# Patient Record
Sex: MALE | Race: BLACK OR AFRICAN AMERICAN | NOT HISPANIC OR LATINO | Employment: FULL TIME | URBAN - METROPOLITAN AREA
[De-identification: names, ages, dates, MRNs, and addresses within clinical notes are randomized per-mention and may not be internally consistent; named-entity substitution may affect disease eponyms.]

---

## 2017-12-19 ENCOUNTER — OFFICE VISIT (OUTPATIENT)
Dept: NEUROLOGY | Facility: CLINIC | Age: 47
End: 2017-12-19

## 2017-12-19 VITALS
HEIGHT: 73 IN | WEIGHT: 223 LBS | DIASTOLIC BLOOD PRESSURE: 80 MMHG | SYSTOLIC BLOOD PRESSURE: 122 MMHG | BODY MASS INDEX: 29.55 KG/M2

## 2017-12-19 DIAGNOSIS — R20.2 NUMBNESS AND TINGLING OF RIGHT SIDE OF FACE: Primary | ICD-10-CM

## 2017-12-19 DIAGNOSIS — R20.0 NUMBNESS AND TINGLING OF RIGHT SIDE OF FACE: Primary | ICD-10-CM

## 2017-12-19 PROCEDURE — 99243 OFF/OP CNSLTJ NEW/EST LOW 30: CPT | Performed by: PSYCHIATRY & NEUROLOGY

## 2017-12-19 NOTE — PROGRESS NOTES
Subjective:     Patient ID: Steve Abdi is a 47 y.o. male.    History of Present Illness   The patient is a 47-year-old right-handed gentleman who was seen for further evaluation of some numbness and pain along the right side of his face and scalp. The patient was seen today in consultation per the request of Dr. Chandler.  He had problems with a root canal in a right mandibular molar and since that time this problem has been prominent.  It is slowly improving.  Was tried on inflammatory medication at times he gets stabbing pains.  It is more of an aggravation than any sort of disability.  He is not had any neurodiagnostic studies.    The following portions of the patient's history were reviewed and updated as appropriate: allergies, current medications, past family history, past medical history, past social history, past surgical history and problem list.    Family History   Problem Relation Age of Onset   • Dementia Mother    • Hypertension Mother    • Arthritis Mother        Active Ambulatory Problems     Diagnosis Date Noted   • Numbness and tingling of right side of face 12/19/2017     Resolved Ambulatory Problems     Diagnosis Date Noted   • No Resolved Ambulatory Problems     Past Medical History:   Diagnosis Date   • Headache, tension-type    • Lactose intolerance      Social History     Social History   • Marital status: Single     Spouse name: N/A   • Number of children: N/A   • Years of education: N/A     Occupational History   • Not on file.     Social History Main Topics   • Smoking status: Never Smoker   • Smokeless tobacco: Not on file   • Alcohol use 1.8 oz/week     1 Glasses of wine, 1 Cans of beer, 1 Shots of liquor per week   • Drug use: Not on file   • Sexual activity: Not on file     Other Topics Concern   • Not on file     Social History Narrative     No current outpatient prescriptions on file.    Review of Systems   Constitutional: Negative.    HENT: Positive for congestion, dental problem,  ear pain, sinus pressure and tinnitus. Negative for drooling, ear discharge, facial swelling, hearing loss, mouth sores, nosebleeds, postnasal drip, rhinorrhea, sinus pain, sneezing, sore throat, trouble swallowing and voice change.    Eyes: Positive for photophobia. Negative for pain, discharge, redness, itching and visual disturbance.   Respiratory: Negative.    Cardiovascular: Negative.    Gastrointestinal: Negative.    Endocrine: Negative.    Musculoskeletal: Positive for arthralgias, myalgias, neck pain and neck stiffness. Negative for back pain, gait problem and joint swelling.   Skin: Negative.    Allergic/Immunologic: Positive for food allergies. Negative for environmental allergies and immunocompromised state.   Neurological: Positive for headaches. Negative for dizziness, tremors, seizures, syncope, facial asymmetry, speech difficulty, weakness, light-headedness and numbness.   Hematological: Negative.    Psychiatric/Behavioral: Negative.         Objective:    Neurologic Exam  Mental status was appropriate for age.  Fundoscopy showed no papilledema. Visual fields were full to OKN.  Eye movements were full and conjugate.  Pupillary reflexes were mid-range and symmetric.  No facial weakness was noted.  Tongue was midline.  There was no pattern of focal weakness.  Gait was appropriate for age.  No pathologic reflexes were noted.  No cerebellar signs were noted.  Tone was normal.  Deep tendon reflexes were 2+ and symmetric.  Full sensation was normal to pinprick.  Muscles of mastication were normal.  Physical Exam    Assessment/Plan:     Steve was seen today for headache.    Diagnoses and all orders for this visit:    Numbness and tingling of right side of face       As likely there has been some nerve irritation after his dental procedure.  This is slowly improving.  He has no fixed neurological deficits.  I feel that further testing is not warranted.  As he is improving a feel intervention with medication  is not indicated.  Mostly attempted to reassure him.    Plan to see him back in the office as necessary.     Thank you for allowing me to share in the care of this patient.  Truman Mitchell M.D.

## 2018-02-02 ENCOUNTER — OFFICE VISIT (OUTPATIENT)
Dept: NEUROLOGY | Facility: CLINIC | Age: 48
End: 2018-02-02

## 2018-02-02 VITALS
BODY MASS INDEX: 29.82 KG/M2 | DIASTOLIC BLOOD PRESSURE: 60 MMHG | WEIGHT: 225 LBS | SYSTOLIC BLOOD PRESSURE: 120 MMHG | HEIGHT: 73 IN

## 2018-02-02 DIAGNOSIS — R20.0 NUMBNESS AND TINGLING OF RIGHT SIDE OF FACE: ICD-10-CM

## 2018-02-02 DIAGNOSIS — R20.2 NUMBNESS AND TINGLING OF RIGHT SIDE OF FACE: ICD-10-CM

## 2018-02-02 DIAGNOSIS — G50.1 PAIN, FACE, ATYPICAL: Primary | ICD-10-CM

## 2018-02-02 PROCEDURE — 99213 OFFICE O/P EST LOW 20 MIN: CPT | Performed by: PSYCHIATRY & NEUROLOGY

## 2018-02-02 NOTE — PROGRESS NOTES
Subjective:     Patient ID: Steve Abdi is a 47 y.o. male.    History of Present Illness   The patient continues to have persistent pain and numbness along the right side of his face.  This began when he had a root canal in July.  It has been improving but does persist and is bothersome there are days where it is quite severe.  He has not had a neurodiagnostic studies.  The following portions of the patient's history were reviewed and updated as appropriate: allergies, current medications, past family history, past medical history, past social history, past surgical history and problem list.    No current outpatient prescriptions on file.    Review of Systems   Constitutional: Negative.    Neurological: Positive for numbness and headaches. Negative for dizziness, tremors, seizures, syncope, facial asymmetry, speech difficulty, weakness and light-headedness.   Psychiatric/Behavioral: Negative.         Objective:    Neurologic Exam  Mental status examination was appropriate.  Funduscopy, visual fields, eye movements and pupillary reflexes were normal.  No facial weakness was noted.  Gait was normal.  No pattern of focal weakness was noted.  Facial sensation was normal throughout.  TMJ function was normal.  Muscles of mastication were normal.  Physical Exam    Assessment/Plan:     Steve was seen today for numbness.    Diagnoses and all orders for this visit:    Pain, face, atypical  -     MRI Brain With & Without Contrast; Future    Numbness and tingling of right side of face       In view of this patient's symptoms being persistent I set up an MRI the brain to exclude any serious etiology.  The patient will call me following this.  I'm not place him on medicine and plan to see him as needed in the office. Thank you for allowing me to share in the care of this patient.  Truman Mitchell M.D.

## 2018-02-09 ENCOUNTER — HOSPITAL ENCOUNTER (OUTPATIENT)
Dept: MRI IMAGING | Facility: HOSPITAL | Age: 48
Discharge: HOME | End: 2018-02-09
Attending: PSYCHIATRY & NEUROLOGY | Admitting: PSYCHIATRY & NEUROLOGY

## 2018-02-09 DIAGNOSIS — G50.1 PAIN, FACE, ATYPICAL: ICD-10-CM

## 2018-02-09 PROCEDURE — 0 GADOBENATE DIMEGLUMINE 529 MG/ML SOLUTION: Performed by: PSYCHIATRY & NEUROLOGY

## 2018-02-09 PROCEDURE — 70553 MRI BRAIN STEM W/O & W/DYE: CPT

## 2018-02-09 PROCEDURE — A9577 INJ MULTIHANCE: HCPCS | Performed by: PSYCHIATRY & NEUROLOGY

## 2018-02-09 PROCEDURE — 82565 ASSAY OF CREATININE: CPT

## 2018-02-09 RX ADMIN — GADOBENATE DIMEGLUMINE 20 ML: 529 INJECTION, SOLUTION INTRAVENOUS at 08:45

## 2018-02-10 LAB — CREAT BLDA-MCNC: 1.2 MG/DL (ref 0.6–1.3)

## 2018-02-11 ENCOUNTER — APPOINTMENT (OUTPATIENT)
Dept: MRI IMAGING | Facility: HOSPITAL | Age: 48
End: 2018-02-11
Attending: PSYCHIATRY & NEUROLOGY

## 2018-02-13 ENCOUNTER — TELEPHONE (OUTPATIENT)
Dept: NEUROLOGY | Facility: CLINIC | Age: 48
End: 2018-02-13

## 2018-02-13 NOTE — TELEPHONE ENCOUNTER
----- Message from Cristin Wright sent at 2/13/2018  3:10 PM EST -----  Contact: [phone]  Patient would like to know his MRI results.

## 2018-02-22 ENCOUNTER — TELEPHONE (OUTPATIENT)
Dept: NEUROLOGY | Facility: CLINIC | Age: 48
End: 2018-02-22

## 2018-02-22 NOTE — TELEPHONE ENCOUNTER
Patient would like to know if there is anything natural he can take if her gets some more nerve pain. Please advise.

## 2018-10-21 ENCOUNTER — APPOINTMENT (OUTPATIENT)
Dept: GENERAL RADIOLOGY | Facility: HOSPITAL | Age: 48
End: 2018-10-21

## 2018-10-21 ENCOUNTER — HOSPITAL ENCOUNTER (EMERGENCY)
Facility: HOSPITAL | Age: 48
Discharge: HOME | End: 2018-10-21
Attending: EMERGENCY MEDICINE | Admitting: EMERGENCY MEDICINE

## 2018-10-21 VITALS
HEART RATE: 71 BPM | HEIGHT: 73 IN | OXYGEN SATURATION: 100 % | BODY MASS INDEX: 29.16 KG/M2 | SYSTOLIC BLOOD PRESSURE: 118 MMHG | WEIGHT: 220 LBS | RESPIRATION RATE: 16 BRPM | DIASTOLIC BLOOD PRESSURE: 65 MMHG | TEMPERATURE: 99 F

## 2018-10-21 DIAGNOSIS — R07.89 ATYPICAL CHEST PAIN: Primary | ICD-10-CM

## 2018-10-21 LAB
ALBUMIN SERPL-MCNC: 4.4 G/DL (ref 3.5–5.2)
ALBUMIN/GLOB SERPL: 1.2 G/DL
ALP SERPL-CCNC: 69 U/L (ref 39–117)
ALT SERPL W P-5'-P-CCNC: 19 U/L (ref 1–41)
ANION GAP SERPL CALCULATED.3IONS-SCNC: 13.1 MMOL/L
AST SERPL-CCNC: 23 U/L (ref 1–40)
BASOPHILS # BLD AUTO: 0.01 10*3/MM3 (ref 0–0.2)
BASOPHILS NFR BLD AUTO: 0.2 % (ref 0–1.5)
BILIRUB SERPL-MCNC: 1 MG/DL (ref 0.1–1.2)
BUN BLD-MCNC: 11 MG/DL (ref 6–20)
BUN/CREAT SERPL: 8.9 (ref 7–25)
CALCIUM SPEC-SCNC: 9.8 MG/DL (ref 8.6–10.5)
CHLORIDE SERPL-SCNC: 100 MMOL/L (ref 98–107)
CO2 SERPL-SCNC: 24.9 MMOL/L (ref 22–29)
CREAT BLD-MCNC: 1.24 MG/DL (ref 0.76–1.27)
DEPRECATED RDW RBC AUTO: 45.4 FL (ref 37–54)
EOSINOPHIL # BLD AUTO: 0.08 10*3/MM3 (ref 0–0.7)
EOSINOPHIL NFR BLD AUTO: 2 % (ref 0.3–6.2)
ERYTHROCYTE [DISTWIDTH] IN BLOOD BY AUTOMATED COUNT: 14.8 % (ref 11.5–14.5)
GFR SERPL CREATININE-BSD FRML MDRD: 76 ML/MIN/1.73
GLOBULIN UR ELPH-MCNC: 3.8 GM/DL
GLUCOSE BLD-MCNC: 106 MG/DL (ref 65–99)
HCT VFR BLD AUTO: 44.4 % (ref 40.4–52.2)
HGB BLD-MCNC: 15 G/DL (ref 13.7–17.6)
IMM GRANULOCYTES # BLD: 0.01 10*3/MM3 (ref 0–0.03)
IMM GRANULOCYTES NFR BLD: 0.2 % (ref 0–0.5)
LIPASE SERPL-CCNC: 16 U/L (ref 13–60)
LYMPHOCYTES # BLD AUTO: 1.46 10*3/MM3 (ref 0.9–4.8)
LYMPHOCYTES NFR BLD AUTO: 35.7 % (ref 19.6–45.3)
MCH RBC QN AUTO: 28.1 PG (ref 27–32.7)
MCHC RBC AUTO-ENTMCNC: 33.8 G/DL (ref 32.6–36.4)
MCV RBC AUTO: 83.1 FL (ref 79.8–96.2)
MONOCYTES # BLD AUTO: 0.25 10*3/MM3 (ref 0.2–1.2)
MONOCYTES NFR BLD AUTO: 6.1 % (ref 5–12)
NEUTROPHILS # BLD AUTO: 2.29 10*3/MM3 (ref 1.9–8.1)
NEUTROPHILS NFR BLD AUTO: 56 % (ref 42.7–76)
PLATELET # BLD AUTO: 230 10*3/MM3 (ref 140–500)
PMV BLD AUTO: 9.1 FL (ref 6–12)
POTASSIUM BLD-SCNC: 4.1 MMOL/L (ref 3.5–5.2)
PROT SERPL-MCNC: 8.2 G/DL (ref 6–8.5)
RBC # BLD AUTO: 5.34 10*6/MM3 (ref 4.6–6)
SODIUM BLD-SCNC: 138 MMOL/L (ref 136–145)
TROPONIN T SERPL-MCNC: <0.01 NG/ML (ref 0–0.03)
TROPONIN T SERPL-MCNC: <0.01 NG/ML (ref 0–0.03)
WBC NRBC COR # BLD: 4.09 10*3/MM3 (ref 4.5–10.7)

## 2018-10-21 PROCEDURE — 84484 ASSAY OF TROPONIN QUANT: CPT | Performed by: EMERGENCY MEDICINE

## 2018-10-21 PROCEDURE — 84484 ASSAY OF TROPONIN QUANT: CPT | Performed by: NURSE PRACTITIONER

## 2018-10-21 PROCEDURE — 71045 X-RAY EXAM CHEST 1 VIEW: CPT

## 2018-10-21 PROCEDURE — 93005 ELECTROCARDIOGRAM TRACING: CPT | Performed by: EMERGENCY MEDICINE

## 2018-10-21 PROCEDURE — 80053 COMPREHEN METABOLIC PANEL: CPT | Performed by: NURSE PRACTITIONER

## 2018-10-21 PROCEDURE — 85025 COMPLETE CBC W/AUTO DIFF WBC: CPT | Performed by: NURSE PRACTITIONER

## 2018-10-21 PROCEDURE — 99283 EMERGENCY DEPT VISIT LOW MDM: CPT

## 2018-10-21 PROCEDURE — 83690 ASSAY OF LIPASE: CPT | Performed by: EMERGENCY MEDICINE

## 2018-10-21 PROCEDURE — 93010 ELECTROCARDIOGRAM REPORT: CPT | Performed by: INTERNAL MEDICINE

## 2018-10-21 RX ORDER — OMEPRAZOLE 20 MG/1
20 CAPSULE, DELAYED RELEASE ORAL DAILY
Qty: 30 CAPSULE | Refills: 0 | Status: SHIPPED | OUTPATIENT
Start: 2018-10-21 | End: 2018-10-30

## 2018-10-21 RX ORDER — PYRIDOXINE HCL (VITAMIN B6) 100 MG
1 TABLET ORAL AS NEEDED
COMMUNITY

## 2018-10-21 NOTE — ED NOTES
Pt states last week he was having upper right quadrant pain and went to the immediate care where he was given doxycycline, for the past several days the pain has gone to mid chest and also has intermediately right lower back pain. Pt is in NAD and resting comfortably with call light within reach.      Mami Aparicio, RN  10/21/18 9818

## 2018-10-21 NOTE — ED TRIAGE NOTES
Chest pains for about a week now, patient states that the pains are in the center of his chest and sometimes radiate under his right chest. Patient states he felt them last night while resting in bed.

## 2018-10-21 NOTE — ED PROVIDER NOTES
" EMERGENCY DEPARTMENT ENCOUNTER    CHIEF COMPLAINT  Chief Complaint: Chest Pain  History given by: Patient  History limited by: Vague Historian  Room Number: 27/27  PMD: Tavon Cahndler MD      HPI:  Pt is a 48 y.o. male who presents complaining of Intermittent mid-sternum CP that began a week ago, currently gone. Pt describes the pain as \"burning\" initially for a day but then turned into a discomfort and began 30 minutes after eating pizza.  The pain would last all day at times.  Pt took Tylenol when pain began with mild relief but hasn't taken anything else. Pt reports similar episode 6 years ago saw GI and told it was reflux. Pt denies any other aggravating factors or alleviating factors. Pt reports belching and increased gas. Pt was taking Cipro few weeks ago and Rx of Doxy and stopped it but started it back up due to concern for GI issues several days ago. Pt has seen Urologist and been to [facility] with unremarkable tests which included a urinalysis and urine culture. Pt denies fever, SOA, cough, cold, N/V/D, or pain or swelling in legs. Pt denies smoking. Pt drinks two days a week. Pt denies Hx of HLD or HTN. FMHx of HTN but no heart disease. Pt denies illegal drug use.       Duration:  1 week  Onset: gradual  Timing: intermittent  Location: mid-sternum  Radiation: none  Quality: \"burning\"  Intensity/Severity: moderate  Progression: resolved  Associated Symptoms: belching, RUQ  Aggravating Factors: pain developed 30 minutes after eating pizza  Alleviating Factors: none  Previous Episodes: Pt reports similar episode 6 years ago saw GI and told it was reflux.  Treatment before arrival: none    PAST MEDICAL HISTORY  Active Ambulatory Problems     Diagnosis Date Noted   • Numbness and tingling of right side of face 12/19/2017   • Pain, face, atypical 02/02/2018     Resolved Ambulatory Problems     Diagnosis Date Noted   • No Resolved Ambulatory Problems     Past Medical History:   Diagnosis Date   • Headache, " tension-type    • Lactose intolerance    • UTI (urinary tract infection)        PAST SURGICAL HISTORY  Past Surgical History:   Procedure Laterality Date   • CIRCUMCISION     • HERNIA REPAIR     • UPPER GASTROINTESTINAL ENDOSCOPY  04/19/2013    inactive gastritis        FAMILY HISTORY  Family History   Problem Relation Age of Onset   • Dementia Mother    • Hypertension Mother    • Arthritis Mother        SOCIAL HISTORY  Social History     Social History   • Marital status: Single     Spouse name: N/A   • Number of children: N/A   • Years of education: N/A     Occupational History   • Not on file.     Social History Main Topics   • Smoking status: Never Smoker   • Smokeless tobacco: Not on file   • Alcohol use 1.8 oz/week     1 Glasses of wine, 1 Cans of beer, 1 Shots of liquor per week   • Drug use: Unknown   • Sexual activity: Not on file     Other Topics Concern   • Not on file     Social History Narrative   • No narrative on file       ALLERGIES  Iodinated diagnostic agents    REVIEW OF SYSTEMS  Review of Systems   Constitutional: Negative for activity change, appetite change and fever.   HENT: Negative for congestion and sore throat.    Eyes: Negative.    Respiratory: Negative for cough and shortness of breath.    Cardiovascular: Positive for chest pain (mid-sternum). Negative for leg swelling.   Gastrointestinal: Positive for abdominal pain (RUQ). Negative for diarrhea and vomiting.   Endocrine: Negative.    Genitourinary: Negative for decreased urine volume and dysuria.   Musculoskeletal: Negative for neck pain.   Skin: Negative for rash and wound.   Allergic/Immunologic: Negative.    Neurological: Negative for weakness, numbness and headaches.   Hematological: Negative.    Psychiatric/Behavioral: Negative.    All other systems reviewed and are negative.      PHYSICAL EXAM  ED Triage Vitals   Temp Heart Rate Resp BP SpO2   10/21/18 1414 10/21/18 1414 10/21/18 1414 10/21/18 1430 10/21/18 1414   97.5 °F (36.4  °C) 77 14 134/79 100 %      Temp src Heart Rate Source Patient Position BP Location FiO2 (%)   10/21/18 1414 10/21/18 1414 10/21/18 1528 10/21/18 1528 --   Tympanic Monitor Lying Left arm        Physical Exam   Constitutional: He is oriented to person, place, and time. No distress.   HENT:   Head: Normocephalic and atraumatic.   Eyes: Pupils are equal, round, and reactive to light. EOM are normal.   Neck: Normal range of motion. Neck supple.   Cardiovascular: Normal rate, regular rhythm, normal heart sounds and intact distal pulses.    No murmur heard.  Pulmonary/Chest: Effort normal and breath sounds normal. No respiratory distress.   O2 sats = 100% on room air   Abdominal: Soft. Bowel sounds are normal. He exhibits no distension. There is no tenderness. There is no rebound and no guarding.   Musculoskeletal: Normal range of motion. He exhibits no edema.   Neurological: He is alert and oriented to person, place, and time. He has normal sensation and normal strength. No cranial nerve deficit. Gait normal. GCS score is 15.   Skin: Skin is warm and dry.   Psychiatric: Mood and affect normal.   Nursing note and vitals reviewed.      LAB RESULTS  Lab Results (last 24 hours)     Procedure Component Value Units Date/Time    CBC & Differential [[id number]] Collected:  10/21/18 1432    Specimen:  Blood Updated:  10/21/18 9358    Narrative:       The following orders were created for panel order CBC & Differential.  Procedure                               Abnormality         Status                     ---------                               -----------         ------                     CBC Auto Differential[[id number]]        Abnormal            Final result                 Please view results for these tests on the individual orders.    Comprehensive Metabolic Panel [[id number]]  (Abnormal) Collected:  10/21/18 1432    Specimen:  Blood Updated:  10/21/18 1519     Glucose 106 (H) mg/dL      BUN 11 mg/dL      Creatinine 1.24  mg/dL      Sodium 138 mmol/L      Potassium 4.1 mmol/L      Chloride 100 mmol/L      CO2 24.9 mmol/L      Calcium 9.8 mg/dL      Total Protein 8.2 g/dL      Albumin 4.40 g/dL      ALT (SGPT) 19 U/L      AST (SGOT) 23 U/L      Comment: Specimen hemolyzed.  Results may be affected.        Alkaline Phosphatase 69 U/L      Total Bilirubin 1.0 mg/dL      eGFR  African Amer 76 mL/min/1.73      Globulin 3.8 gm/dL      A/G Ratio 1.2 g/dL      BUN/Creatinine Ratio 8.9     Anion Gap 13.1 mmol/L     Troponin [[id number]]  (Normal) Collected:  10/21/18 1432    Specimen:  Blood Updated:  10/21/18 1509     Troponin T <0.010 ng/mL     Narrative:       Troponin T Reference Ranges:  Less than 0.03 ng/mL:    Negative for AMI  0.03 to 0.09 ng/mL:      Indeterminant for AMI  Greater than 0.09 ng/mL: Positive for AMI    CBC Auto Differential [[id number]]  (Abnormal) Collected:  10/21/18 1432    Specimen:  Blood Updated:  10/21/18 1448     WBC 4.09 (L) 10*3/mm3      RBC 5.34 10*6/mm3      Hemoglobin 15.0 g/dL      Hematocrit 44.4 %      MCV 83.1 fL      MCH 28.1 pg      MCHC 33.8 g/dL      RDW 14.8 (H) %      RDW-SD 45.4 fl      MPV 9.1 fL      Platelets 230 10*3/mm3      Neutrophil % 56.0 %      Lymphocyte % 35.7 %      Monocyte % 6.1 %      Eosinophil % 2.0 %      Basophil % 0.2 %      Immature Grans % 0.2 %      Neutrophils, Absolute 2.29 10*3/mm3      Lymphocytes, Absolute 1.46 10*3/mm3      Monocytes, Absolute 0.25 10*3/mm3      Eosinophils, Absolute 0.08 10*3/mm3      Basophils, Absolute 0.01 10*3/mm3      Immature Grans, Absolute 0.01 10*3/mm3     Lipase [[id number]]  (Normal) Collected:  10/21/18 1432    Specimen:  Blood Updated:  10/21/18 1636     Lipase 16 U/L     Troponin [[id number]]  (Normal) Collected:  10/21/18 1724    Specimen:  Blood Updated:  10/21/18 1756     Troponin T <0.010 ng/mL     Narrative:       Troponin T Reference Ranges:  Less than 0.03 ng/mL:    Negative for AMI  0.03 to 0.09 ng/mL:      Indeterminant for  AMI  Greater than 0.09 ng/mL: Positive for AMI          I ordered the above labs and reviewed the results    RADIOLOGY  XR Chest 1 View   Final Result   Negative AP portable chest radiograph.       This report was finalized on 10/21/2018 4:55 PM by Dr. Mickey Omalley M.D.               I ordered the above noted radiological studies. Interpreted by radiologist.  Reviewed by me in PACS.       PROCEDURES  Procedures    EKG          EKG time: 218  Rhythm/Rate: 72,NSR  QRS, axis: Narrow QRs, Nml axis  ST and T waves: No acute process   Nml QT      Interpreted Contemporaneously by me, independently viewed  No old for comparison    PROGRESS AND CONSULTS  ED Course as of Oct 21 1926   Sun Oct 21, 2018   1441 Ruq pain, cp  [KG]   1623 PERC rule is negative with a very low index suspicion of PE. He has no SOB or Pleuritic chest pain. NSR with O2 sat 100 %  [MM]   1842 6:43 PM  I talked with the patient at length and answered all questions.  He is pain seems very atypical for a cardiac etiology, pulmonary embolism etiology, or thoracic aortic dissection etiology.  It seems very likely that it is GI related and that it initially started as a burning sensation and then a mild persistent headache.  The symptoms started after he ate some pizza.  He is also had dyspepsia as well.  Several years ago he had a similar episode of discomfort that was diagnosed as gastroesophageal reflux disease.  Patient's heart score is 1.  He does not have a pleuritic nature to his chest pain and has good strong equal pulses to his upper extremity and lower extremity within normal appearing chest x-ray.I have discussed at length with the patient the results of the tests.  I have a low index of suspicion that anything is serious occurring.  The symptoms are atypical for coronary artery disease, pulmonary embolism, or thoracic aortic dissection.  The patient understands the limitations of testing and understands that we are unable to rule out a  disease process 100%.  The patient feels comfortable and agrees in being discharged home.  The patient will will return if symptoms worsen, develop shortness of breath, weakness in extremities, or any concerns.  They will also follow up with cardiology as provided.  I also did offer the patient if he was concerned admission and observation and he refused.    [MM]      ED Course User Index  [KG] Krys Solis, APRN  [MM] David Soto MD     PERC score =     1415  Ordered EKG.     1616  Notified pt of unremarkable lab work, including negative Troponin. Discussed plan to further evaluate with repeat troponin. Pt is agreeable.     1622  Ordered second troponin and lipase.     1834  Pt recheck. Notified pt of negative CXR results, negative lab work, including second troponin and lipase. Discussed plan to discharge pt home with Rx for Prilosec and f/u with Cardiology for possible stress test. Instructed pt to RTER if worsening Sx or pt develops fever or SOA. Advised pt to avoid NSAIDs, caffiene, and alcohol. Pt understands and agrees with treatment plan, all concerns addressed.         MEDICAL DECISION MAKING  Results were reviewed/discussed with the patient and they were also made aware of online access. Pt also made aware that some labs, such as cultures, will not be resulted during ER visit and follow up with PMD is necessary.     MDM  Number of Diagnoses or Management Options  Atypical chest pain:      Amount and/or Complexity of Data Reviewed  Clinical lab tests: ordered and reviewed (1st Troponin < 0.01)  Tests in the radiology section of CPT®: ordered and reviewed (CXR shows no acute findings. )  Tests in the medicine section of CPT®: ordered and reviewed (See EKG results in procedure. )  Independent visualization of images, tracings, or specimens: yes           DIAGNOSIS  Final diagnoses:   Atypical chest pain       DISPOSITION  DISCHARGE    Patient discharged in stable condition.    Reviewed implications  of results, diagnosis, meds, responsibility to follow up, warning signs and symptoms of possible worsening, potential complications and reasons to return to ER.    Patient/Family voiced understanding of above instructions.    Discussed plan for discharge, as there is no emergent indication for admission. Patient referred to primary care provider for BP management due to today's BP. Pt/family is agreeable and understands need for follow up and repeat testing.  Pt is aware that discharge does not mean that nothing is wrong but it indicates no emergency is present that requires admission and they must continue care with follow-up as given below or physician of their choice.     FOLLOW-UP  Kaveh Suresh MD  [address] [facility] [address]  [facility] [id number] [phone]      Call in the am for appointment., Return if pain worsens, If symptoms worsen, shortness of breath, fever, any concerns    Tavon Chandler MD  [address] [facility] [id number] [phone]    In 1 week  Return if pain worsens, If symptoms worsen, shortness of breath, fever, any concerns         Medication List      New Prescriptions    omeprazole 20 MG capsule  Commonly known as:  priLOSEC  Take 1 capsule by mouth Daily for 30 days.              Latest Documented Vital Signs:  As of 7:26 PM  BP- 118/65 HR- 71 Temp- 99 °F (37.2 °C) (Oral) O2 sat- 100%    --  Documentation assistance provided by randall Luke for Dr. Soto.  Information recorded by the scribe was done at my direction and has been verified and validated by me.          Truman Luke  10/21/18 1916       David Soto MD  10/21/18 1926

## 2018-10-26 ENCOUNTER — HOSPITAL ENCOUNTER (EMERGENCY)
Facility: HOSPITAL | Age: 48
Discharge: HOME | End: 2018-10-26
Attending: EMERGENCY MEDICINE | Admitting: EMERGENCY MEDICINE

## 2018-10-26 VITALS
HEIGHT: 73 IN | RESPIRATION RATE: 16 BRPM | WEIGHT: 220 LBS | HEART RATE: 80 BPM | SYSTOLIC BLOOD PRESSURE: 125 MMHG | OXYGEN SATURATION: 100 % | BODY MASS INDEX: 29.16 KG/M2 | TEMPERATURE: 97 F | DIASTOLIC BLOOD PRESSURE: 74 MMHG

## 2018-10-26 DIAGNOSIS — K21.9 GASTROESOPHAGEAL REFLUX DISEASE WITHOUT ESOPHAGITIS: Primary | ICD-10-CM

## 2018-10-26 DIAGNOSIS — R07.9 CHEST PAIN IN ADULT: ICD-10-CM

## 2018-10-26 LAB
ALBUMIN SERPL-MCNC: 4.7 G/DL (ref 3.5–5.2)
ALBUMIN/GLOB SERPL: 1.3 G/DL
ALP SERPL-CCNC: 70 U/L (ref 39–117)
ALT SERPL W P-5'-P-CCNC: 18 U/L (ref 1–41)
ANION GAP SERPL CALCULATED.3IONS-SCNC: 14.9 MMOL/L
AST SERPL-CCNC: 20 U/L (ref 1–40)
BASOPHILS # BLD AUTO: 0.01 10*3/MM3 (ref 0–0.2)
BASOPHILS NFR BLD AUTO: 0.2 % (ref 0–1.5)
BILIRUB SERPL-MCNC: 1.1 MG/DL (ref 0.1–1.2)
BUN BLD-MCNC: 10 MG/DL (ref 6–20)
BUN/CREAT SERPL: 8.4 (ref 7–25)
CALCIUM SPEC-SCNC: 10.1 MG/DL (ref 8.6–10.5)
CHLORIDE SERPL-SCNC: 100 MMOL/L (ref 98–107)
CO2 SERPL-SCNC: 23.1 MMOL/L (ref 22–29)
CREAT BLD-MCNC: 1.19 MG/DL (ref 0.76–1.27)
D DIMER PPP FEU-MCNC: 0.39 MCGFEU/ML (ref 0–0.49)
DEPRECATED RDW RBC AUTO: 43.6 FL (ref 37–54)
EOSINOPHIL # BLD AUTO: 0.05 10*3/MM3 (ref 0–0.7)
EOSINOPHIL NFR BLD AUTO: 1 % (ref 0.3–6.2)
ERYTHROCYTE [DISTWIDTH] IN BLOOD BY AUTOMATED COUNT: 14.4 % (ref 11.5–14.5)
GFR SERPL CREATININE-BSD FRML MDRD: 80 ML/MIN/1.73
GLOBULIN UR ELPH-MCNC: 3.5 GM/DL
GLUCOSE BLD-MCNC: 96 MG/DL (ref 65–99)
HCT VFR BLD AUTO: 42 % (ref 40.4–52.2)
HGB BLD-MCNC: 14.5 G/DL (ref 13.7–17.6)
HOLD SPECIMEN: NORMAL
HOLD SPECIMEN: NORMAL
IMM GRANULOCYTES # BLD: 0 10*3/MM3 (ref 0–0.03)
IMM GRANULOCYTES NFR BLD: 0 % (ref 0–0.5)
LYMPHOCYTES # BLD AUTO: 1.57 10*3/MM3 (ref 0.9–4.8)
LYMPHOCYTES NFR BLD AUTO: 30.9 % (ref 19.6–45.3)
MCH RBC QN AUTO: 28.4 PG (ref 27–32.7)
MCHC RBC AUTO-ENTMCNC: 34.5 G/DL (ref 32.6–36.4)
MCV RBC AUTO: 82.4 FL (ref 79.8–96.2)
MONOCYTES # BLD AUTO: 0.33 10*3/MM3 (ref 0.2–1.2)
MONOCYTES NFR BLD AUTO: 6.5 % (ref 5–12)
NEUTROPHILS # BLD AUTO: 3.12 10*3/MM3 (ref 1.9–8.1)
NEUTROPHILS NFR BLD AUTO: 61.4 % (ref 42.7–76)
PLATELET # BLD AUTO: 245 10*3/MM3 (ref 140–500)
PMV BLD AUTO: 9.4 FL (ref 6–12)
POTASSIUM BLD-SCNC: 3.8 MMOL/L (ref 3.5–5.2)
PROT SERPL-MCNC: 8.2 G/DL (ref 6–8.5)
RBC # BLD AUTO: 5.1 10*6/MM3 (ref 4.6–6)
SODIUM BLD-SCNC: 138 MMOL/L (ref 136–145)
TROPONIN T SERPL-MCNC: <0.01 NG/ML (ref 0–0.03)
WBC NRBC COR # BLD: 5.08 10*3/MM3 (ref 4.5–10.7)
WHOLE BLOOD HOLD SPECIMEN: NORMAL
WHOLE BLOOD HOLD SPECIMEN: NORMAL

## 2018-10-26 PROCEDURE — 85025 COMPLETE CBC W/AUTO DIFF WBC: CPT | Performed by: PHYSICIAN ASSISTANT

## 2018-10-26 PROCEDURE — 84484 ASSAY OF TROPONIN QUANT: CPT | Performed by: PHYSICIAN ASSISTANT

## 2018-10-26 PROCEDURE — 99283 EMERGENCY DEPT VISIT LOW MDM: CPT

## 2018-10-26 PROCEDURE — 85379 FIBRIN DEGRADATION QUANT: CPT | Performed by: PHYSICIAN ASSISTANT

## 2018-10-26 PROCEDURE — 93005 ELECTROCARDIOGRAM TRACING: CPT | Performed by: EMERGENCY MEDICINE

## 2018-10-26 PROCEDURE — 80053 COMPREHEN METABOLIC PANEL: CPT | Performed by: PHYSICIAN ASSISTANT

## 2018-10-26 PROCEDURE — 93005 ELECTROCARDIOGRAM TRACING: CPT

## 2018-10-26 PROCEDURE — 93010 ELECTROCARDIOGRAM REPORT: CPT | Performed by: INTERNAL MEDICINE

## 2018-10-26 NOTE — ED NOTES
Pt reports hx of asthma. He reports after drinking a smoothie with banana in it he had inc SOA; he wonders if he has an allergy to bananas. Reassurance given; call light in reach. Pts breathing even and unlabored. Pt appears in NAD at this time. Family at bedside.        Sanaz Green, RN  10/26/18 1695

## 2018-10-26 NOTE — ED NOTES
Patient reports that his chest discomfort/SOA is improving. Earlier, patient states he had burning sensation in chest that radiated down his left arm after eating his smoothie containing banana this morning. Today was not the first time having bananas, but he said the one he had today wasn't ripe yet. Today, instead of taking his Prilosec prior to eating breakfast, he skipped it to see if he could handle food without it. Patient just started on Prilosec this past Monday.     Jade Ventura, JORGE  10/26/18 6640

## 2018-10-26 NOTE — ED PROVIDER NOTES
" EMERGENCY DEPARTMENT ENCOUNTER    CHIEF COMPLAINT  Chief Complaint: Dyspnea  History given by: Patient  History limited by:   Room Number: 36/36  PMD: Tavon Chandler MD      HPI:  Pt is a 48 y.o. male who presents complaining of dyspnea that began 5 days ago. Pt reports he also has had mild CP since that time. Pt was seen here at that time. He reports that he has hx of GERD and has been taking Prilosec. Pt reports that he has skipped some dosages of his Prilosec. Today he reports eating a banana which later caused some reflux and SOA. Pt denies any abd pain, fever, chills.    Duration: 5 days  Onset: gradual  Timing: intermittent  Quality: \"short of breath\"  Intensity/Severity: moderate  Progression: unchanged  Associated Symptoms: CP, indigestion  Aggravating Factors: food  Alleviating Factors: none  Previous Episodes: hx of GERD  Treatment before arrival: seen here for same 5 days ago. Pt has been taking Prilosec    PAST MEDICAL HISTORY  Active Ambulatory Problems     Diagnosis Date Noted   • Numbness and tingling of right side of face 12/19/2017   • Pain, face, atypical 02/02/2018     Resolved Ambulatory Problems     Diagnosis Date Noted   • No Resolved Ambulatory Problems     Past Medical History:   Diagnosis Date   • Headache, tension-type    • Lactose intolerance    • UTI (urinary tract infection)        PAST SURGICAL HISTORY  Past Surgical History:   Procedure Laterality Date   • CIRCUMCISION     • HERNIA REPAIR     • UPPER GASTROINTESTINAL ENDOSCOPY  04/19/2013    inactive gastritis        FAMILY HISTORY  Family History   Problem Relation Age of Onset   • Dementia Mother    • Hypertension Mother    • Arthritis Mother        SOCIAL HISTORY  Social History     Social History   • Marital status: Single     Spouse name: N/A   • Number of children: N/A   • Years of education: N/A     Occupational History   • Not on file.     Social History Main Topics   • Smoking status: Never Smoker   • Smokeless tobacco: " Not on file   • Alcohol use 1.8 oz/week     1 Glasses of wine, 1 Cans of beer, 1 Shots of liquor per week   • Drug use: Unknown   • Sexual activity: Not on file     Other Topics Concern   • Not on file     Social History Narrative   • No narrative on file       ALLERGIES  Iodinated diagnostic agents    REVIEW OF SYSTEMS  Review of Systems   Constitutional: Negative for activity change, appetite change and fever.   HENT: Negative for congestion and sore throat.    Eyes: Negative.    Respiratory: Positive for shortness of breath. Negative for cough.    Cardiovascular: Positive for chest pain (heartburn). Negative for leg swelling.   Gastrointestinal: Negative for abdominal pain, diarrhea and vomiting.        Inidegstion   Endocrine: Negative.    Genitourinary: Negative for decreased urine volume and dysuria.   Musculoskeletal: Negative for neck pain.   Skin: Negative for rash and wound.   Allergic/Immunologic: Negative.    Neurological: Negative for weakness, numbness and headaches.   Hematological: Negative.    Psychiatric/Behavioral: Negative.    All other systems reviewed and are negative.      PHYSICAL EXAM  ED Triage Vitals   Temp Heart Rate Resp BP SpO2   10/26/18 1309 10/26/18 1308 10/26/18 1308 10/26/18 1553 10/26/18 1308   97 °F (36.1 °C) 80 16 141/81 100 %      Temp src Heart Rate Source Patient Position BP Location FiO2 (%)   10/26/18 1309 10/26/18 1308 10/26/18 1638 10/26/18 1553 --   Tympanic Monitor Lying Right arm        Physical Exam   Constitutional: He is oriented to person, place, and time. No distress.   HENT:   Head: Normocephalic and atraumatic.   Eyes: Pupils are equal, round, and reactive to light. EOM are normal.   Neck: Normal range of motion. Neck supple.   Cardiovascular: Normal rate, regular rhythm and normal heart sounds.    Pulmonary/Chest: Effort normal and breath sounds normal. No respiratory distress.   Abdominal: Soft. There is no tenderness. There is no rebound and no guarding.    Musculoskeletal: Normal range of motion. He exhibits no edema.   Neurological: He is alert and oriented to person, place, and time. He has normal sensation and normal strength.   Skin: Skin is warm and dry.   Psychiatric: Affect normal. His mood appears anxious.   Nursing note and vitals reviewed.      LAB RESULTS  Lab Results (last 24 hours)     Procedure Component Value Units Date/Time    CBC & Differential [[id number]] Collected:  10/26/18 1551    Specimen:  Blood Updated:  10/26/18 1608    Narrative:       The following orders were created for panel order CBC & Differential.  Procedure                               Abnormality         Status                     ---------                               -----------         ------                     CBC Auto Differential[[id number]]        Normal              Final result                 Please view results for these tests on the individual orders.    Comprehensive Metabolic Panel [[id number]] Collected:  10/26/18 1551    Specimen:  Blood Updated:  10/26/18 1632     Glucose 96 mg/dL      BUN 10 mg/dL      Creatinine 1.19 mg/dL      Sodium 138 mmol/L      Potassium 3.8 mmol/L      Chloride 100 mmol/L      CO2 23.1 mmol/L      Calcium 10.1 mg/dL      Total Protein 8.2 g/dL      Albumin 4.70 g/dL      ALT (SGPT) 18 U/L      AST (SGOT) 20 U/L      Alkaline Phosphatase 70 U/L      Total Bilirubin 1.1 mg/dL      eGFR  African Amer 80 mL/min/1.73      Globulin 3.5 gm/dL      A/G Ratio 1.3 g/dL      BUN/Creatinine Ratio 8.4     Anion Gap 14.9 mmol/L     Troponin [[id number]]  (Normal) Collected:  10/26/18 1551    Specimen:  Blood Updated:  10/26/18 1632     Troponin T <0.010 ng/mL     Narrative:       Troponin T Reference Ranges:  Less than 0.03 ng/mL:    Negative for AMI  0.03 to 0.09 ng/mL:      Indeterminant for AMI  Greater than 0.09 ng/mL: Positive for AMI    D-dimer, Quantitative [[id number]]  (Normal) Collected:  10/26/18 1551    Specimen:  Blood Updated:  10/26/18  1626     D-Dimer, Quantitative 0.39 MCGFEU/mL     Narrative:       The Stago D-Dimer test used in conjunction with a clinical pretest probability (PTP) assessment model, has been approved by the FDA to rule out the presence of venous thromboembolism (VTE) in outpatients suspected of deep venous thrombosis (DVT) or pulmonary embolism (PE).     CBC Auto Differential [[id number]]  (Normal) Collected:  10/26/18 1551    Specimen:  Blood Updated:  10/26/18 1608     WBC 5.08 10*3/mm3      RBC 5.10 10*6/mm3      Hemoglobin 14.5 g/dL      Hematocrit 42.0 %      MCV 82.4 fL      MCH 28.4 pg      MCHC 34.5 g/dL      RDW 14.4 %      RDW-SD 43.6 fl      MPV 9.4 fL      Platelets 245 10*3/mm3      Neutrophil % 61.4 %      Lymphocyte % 30.9 %      Monocyte % 6.5 %      Eosinophil % 1.0 %      Basophil % 0.2 %      Immature Grans % 0.0 %      Neutrophils, Absolute 3.12 10*3/mm3      Lymphocytes, Absolute 1.57 10*3/mm3      Monocytes, Absolute 0.33 10*3/mm3      Eosinophils, Absolute 0.05 10*3/mm3      Basophils, Absolute 0.01 10*3/mm3      Immature Grans, Absolute 0.00 10*3/mm3           I ordered the above labs and reviewed the results      PROCEDURES  Procedures      PROGRESS AND CONSULTS  ED Course as of Oct 26 1659   Fri Oct 26, 2018   1546 Soa x 1 week. Here over the weekend for same. Declining chest x-ray  [KA]      ED Course User Index  [KA] Leila Thao PA   4:58 PM  Discussed with pt about his uremarkable exam findings and how I suspect this is his GERD. Instructed the pt to take Prilosec as directed. Pt understands and agrees with plan. All concerns addressed.         MEDICAL DECISION MAKING  Results were reviewed/discussed with the patient and they were also made aware of online access. Pt also made aware that some labs, such as cultures, will not be resulted during ER visit and follow up with PMD is necessary.     MDM  Number of Diagnoses or Management Options  Gastroesophageal reflux disease without esophagitis:       Amount and/or Complexity of Data Reviewed  Clinical lab tests: reviewed and ordered (unremarkable)           DIAGNOSIS  Final diagnoses:   Gastroesophageal reflux disease without esophagitis   Chest pain in adult       DISPOSITION  DISCHARGE    Patient discharged in stable condition.    Reviewed implications of results, diagnosis, meds, responsibility to follow up, warning signs and symptoms of possible worsening, potential complications and reasons to return to ER.    Patient/Family voiced understanding of above instructions.    Discussed plan for discharge, as there is no emergent indication for admission. Patient referred to primary care provider for BP management due to today's BP. Pt/family is agreeable and understands need for follow up and repeat testing.  Pt is aware that discharge does not mean that nothing is wrong but it indicates no emergency is present that requires admission and they must continue care with follow-up as given below or physician of their choice.     FOLLOW-UP  Tavon Chandler MD  [address] [place], [place] [id number] [phone]    Call in 3 days  For Primary Physician follow-up         Medication List      No changes were made to your prescriptions during this visit.           Latest Documented Vital Signs:  As of 4:59 PM  BP- 125/74 HR- 80 Temp- 97 °F (36.1 °C) (Tympanic) O2 sat- 100%    --  Documentation assistance provided by randall Gauthier for Dr. Loaiza.  Information recorded by the scribe was done at my direction and has been verified and validated by me.     Kodak Gauthier  10/26/18 4698       Joshua Loaiza MD  10/26/18 2333

## 2018-10-30 ENCOUNTER — OFFICE VISIT (OUTPATIENT)
Dept: GASTROENTEROLOGY | Facility: CLINIC | Age: 48
End: 2018-10-30

## 2018-10-30 VITALS
BODY MASS INDEX: 28.36 KG/M2 | HEIGHT: 73 IN | WEIGHT: 214 LBS | SYSTOLIC BLOOD PRESSURE: 126 MMHG | DIASTOLIC BLOOD PRESSURE: 78 MMHG | TEMPERATURE: 98.5 F

## 2018-10-30 DIAGNOSIS — R12 HEARTBURN: Primary | ICD-10-CM

## 2018-10-30 PROCEDURE — 99214 OFFICE O/P EST MOD 30 MIN: CPT | Performed by: INTERNAL MEDICINE

## 2018-10-30 RX ORDER — PANTOPRAZOLE SODIUM 40 MG/1
40 TABLET, DELAYED RELEASE ORAL DAILY
Qty: 30 TABLET | Refills: 12 | Status: SHIPPED | OUTPATIENT
Start: 2018-10-30

## 2018-10-30 RX ORDER — CALCIUM CARBONATE 200(500)MG
1 TABLET,CHEWABLE ORAL AS NEEDED
COMMUNITY

## 2018-10-30 NOTE — PROGRESS NOTES
Chief Complaint   Patient presents with   • Abdominal Pain   • Heartburn       Steve Abdi is a 48 y.o. male who presents with heartburn    48-year-old with heartburn and suspected esophageal spasms with 2 emergency room visits.  CBC and CMP were normal.  Chest x-ray normal.  No pain with exertion, only after meals.  Omeprazole has improved some symptoms but not eradicated them.  He had an EGD 4 years ago showing a small hiatal hernia otherwise negative.  Colonoscopy was normal 2 years ago.  No dysphagia, minimal weight loss, no vomiting.        Past Medical History:   Diagnosis Date   • Headache, tension-type    • Lactose intolerance    • UTI (urinary tract infection)        Past Surgical History:   Procedure Laterality Date   • CIRCUMCISION     • HERNIA REPAIR     • UPPER GASTROINTESTINAL ENDOSCOPY  04/19/2013    inactive gastritis          Current Outpatient Prescriptions:   •  calcium carbonate (TUMS) 500 MG chewable tablet, Chew 1 tablet Daily., Disp: , Rfl:   •  omeprazole (priLOSEC) 20 MG capsule, Take 1 capsule by mouth Daily for 30 days., Disp: 30 capsule, Rfl: 0  •  Milk Thistle 200 MG capsule, Take  by mouth., Disp: , Rfl:     Allergies   Allergen Reactions   • Iodinated Diagnostic Agents Rash       Social History     Social History   • Marital status: Single     Spouse name: N/A   • Number of children: N/A   • Years of education: N/A     Occupational History   • Not on file.     Social History Main Topics   • Smoking status: Never Smoker   • Smokeless tobacco: Not on file   • Alcohol use 1.8 oz/week     1 Glasses of wine, 1 Cans of beer, 1 Shots of liquor per week   • Drug use: Unknown   • Sexual activity: Not on file     Other Topics Concern   • Not on file     Social History Narrative   • No narrative on file       Family History   Problem Relation Age of Onset   • Dementia Mother    • Hypertension Mother    • Arthritis Mother    • Prostate cancer Maternal Uncle        Review of Systems    Gastrointestinal: Positive for nausea. Negative for abdominal distention, abdominal pain, constipation and diarrhea.   All other systems reviewed and are negative.      Vitals:    10/30/18 0858   BP: 126/78   Temp: 98.5 °F (36.9 °C)       Physical Exam   Constitutional: He is oriented to person, place, and time. He appears well-developed and well-nourished.   HENT:   Head: Normocephalic and atraumatic.   Eyes: Conjunctivae and EOM are normal.   Neck: Normal range of motion. No tracheal deviation present.   Cardiovascular: Normal rate and regular rhythm.    Pulmonary/Chest: Effort normal and breath sounds normal. No respiratory distress.   Abdominal: Soft. Bowel sounds are normal. He exhibits no distension and no mass. There is no tenderness. There is no rebound and no guarding.   Musculoskeletal: Normal range of motion.   Neurological: He is alert and oriented to person, place, and time.   Skin: Skin is warm and dry.   Psychiatric: He has a normal mood and affect. Judgment normal.   Nursing note and vitals reviewed.      No images are attached to the encounter.  Problem list    Heartburn, despite Prilosec 20 mg a day  Taking Tums for breakthrough reflux        Assessment/Plan    Discontinue Prilosec, start Protonix 40 mg by mouth every morning  Office visit 4 weeks if he still symptomatic plan for EGD  He will take a probiotic daily  Low fat diet  GERD lifestyle recommendations discussed for 20 minutes

## 2018-11-19 ENCOUNTER — OFFICE VISIT (OUTPATIENT)
Dept: GASTROENTEROLOGY | Facility: CLINIC | Age: 48
End: 2018-11-19

## 2018-11-19 VITALS
TEMPERATURE: 97.6 F | HEIGHT: 73 IN | BODY MASS INDEX: 28.26 KG/M2 | DIASTOLIC BLOOD PRESSURE: 70 MMHG | WEIGHT: 213.2 LBS | SYSTOLIC BLOOD PRESSURE: 110 MMHG

## 2018-11-19 DIAGNOSIS — R10.13 DYSPEPSIA: ICD-10-CM

## 2018-11-19 DIAGNOSIS — R12 HEARTBURN: Primary | ICD-10-CM

## 2018-11-19 PROCEDURE — 99213 OFFICE O/P EST LOW 20 MIN: CPT | Performed by: INTERNAL MEDICINE

## 2018-11-19 RX ORDER — DESIPRAMINE HYDROCHLORIDE 10 MG/1
10 TABLET ORAL 2 TIMES DAILY
Qty: 60 TABLET | Refills: 3 | Status: SHIPPED | OUTPATIENT
Start: 2018-11-19 | End: 2019-01-08

## 2018-11-19 NOTE — PROGRESS NOTES
Chief Complaint   Patient presents with   • Follow-up     gerd       Steve Abdi is a  48 y.o. male here for a follow up visit for nausea, GERD    38-year-old gentleman with nausea, GERD, dyspepsia.  EGD a few years ago showed hiatal hernia otherwise negative.  H. pylori negative.  I've tried him on PPI and continues to have minor symptoms of GERD and indigestion, he describes as nausea and nervous stomach  He's had no weight loss, vomiting or other alarm symptoms requiring repeat EGD  He's been on PPI a month without much improvement in his symptoms        Past Medical History:   Diagnosis Date   • Atypical chest pain    • Gastroesophageal reflux disease without esophagitis    • Headache, tension-type    • Lactose intolerance    • Numbness and tingling of right side of face    • Pain, face, atypical    • UTI (urinary tract infection)        Past Surgical History:   Procedure Laterality Date   • CIRCUMCISION     • HERNIA REPAIR     • UPPER GASTROINTESTINAL ENDOSCOPY  04/19/2013    inactive gastritis        Scheduled Meds:    Continuous Infusions:  No current facility-administered medications for this visit.     PRN Meds:.    Allergies   Allergen Reactions   • Iodinated Diagnostic Agents Rash       Social History     Socioeconomic History   • Marital status: Single     Spouse name: Not on file   • Number of children: Not on file   • Years of education: Not on file   • Highest education level: Not on file   Social Needs   • Financial resource strain: Not on file   • Food insecurity - worry: Not on file   • Food insecurity - inability: Not on file   • Transportation needs - medical: Not on file   • Transportation needs - non-medical: Not on file   Occupational History   • Not on file   Tobacco Use   • Smoking status: Never Smoker   Substance and Sexual Activity   • Alcohol use: Yes     Alcohol/week: 1.8 oz     Types: 1 Glasses of wine, 1 Cans of beer, 1 Shots of liquor per week   • Drug use: Not on file   • Sexual  activity: Not on file   Other Topics Concern   • Not on file   Social History Narrative   • Not on file       Family History   Problem Relation Age of Onset   • Dementia Mother    • Hypertension Mother    • Arthritis Mother    • Prostate cancer Maternal Uncle        Review of Systems   Gastrointestinal: Positive for nausea. Negative for abdominal distention, abdominal pain, blood in stool, constipation, rectal pain and vomiting.   All other systems reviewed and are negative.      Vitals:    11/19/18 1317   BP: 110/70   Temp: 97.6 °F (36.4 °C)       Physical Exam   Constitutional: He is oriented to person, place, and time. He appears well-developed and well-nourished.   HENT:   Head: Normocephalic and atraumatic.   Eyes: Conjunctivae and EOM are normal.   Neck: Normal range of motion. No tracheal deviation present.   Cardiovascular: Normal rate and regular rhythm.   Pulmonary/Chest: Effort normal and breath sounds normal. No respiratory distress.   Abdominal: Soft. Bowel sounds are normal. He exhibits no distension and no mass. There is no tenderness. There is no rebound and no guarding.   Musculoskeletal: Normal range of motion.   Neurological: He is alert and oriented to person, place, and time.   Skin: Skin is warm and dry.   Psychiatric: He has a normal mood and affect. Judgment normal.   Nursing note and vitals reviewed.      No images are attached to the encounter.  Problem list    Nausea  Dyspepsia  GERD  Atypical chest pain    Assessment/Plan      He is tried PPI for a month with no improvement I've suggested tricyclic antidepressant at low dose to help with functional dyspepsia and he is agreeable.  We will start 10 mg twice a day, we will watch for side effects to include daytime drowsiness and we may need to increase dose  He will continue PPI daily  I see no indication for EGD, gastric emptying study or gallbladder workup at this time  If he is not improved on desipramine we may move to further diagnostic  testing, likely imaging

## 2018-11-29 ENCOUNTER — TELEPHONE (OUTPATIENT)
Dept: GASTROENTEROLOGY | Facility: CLINIC | Age: 48
End: 2018-11-29

## 2018-11-29 NOTE — TELEPHONE ENCOUNTER
----- Message from Naveen Pate sent at 11/29/2018  9:24 AM EST -----  Regarding: desipramine   Contact: [phone]  Complain of allergic reactions to medication. Pt is asking to speak with doc..

## 2018-12-03 ENCOUNTER — TELEPHONE (OUTPATIENT)
Dept: GASTROENTEROLOGY | Facility: CLINIC | Age: 48
End: 2018-12-03

## 2018-12-03 RX ORDER — ESOMEPRAZOLE MAGNESIUM 40 MG/1
40 CAPSULE, DELAYED RELEASE ORAL 2 TIMES DAILY
Qty: 60 CAPSULE | Refills: 2 | Status: SHIPPED | OUTPATIENT
Start: 2018-12-03 | End: 2019-01-08

## 2018-12-05 ENCOUNTER — PRIOR AUTHORIZATION (OUTPATIENT)
Dept: GASTROENTEROLOGY | Facility: CLINIC | Age: 48
End: 2018-12-05

## 2019-01-08 ENCOUNTER — OFFICE VISIT (OUTPATIENT)
Dept: CARDIOLOGY | Facility: CLINIC | Age: 49
End: 2019-01-08

## 2019-01-08 VITALS
SYSTOLIC BLOOD PRESSURE: 124 MMHG | HEIGHT: 73 IN | HEART RATE: 73 BPM | WEIGHT: 201 LBS | BODY MASS INDEX: 26.64 KG/M2 | DIASTOLIC BLOOD PRESSURE: 80 MMHG

## 2019-01-08 DIAGNOSIS — R07.89 OTHER CHEST PAIN: Primary | ICD-10-CM

## 2019-01-08 DIAGNOSIS — K21.9 GASTROESOPHAGEAL REFLUX DISEASE, ESOPHAGITIS PRESENCE NOT SPECIFIED: ICD-10-CM

## 2019-01-08 DIAGNOSIS — R12 HEARTBURN: ICD-10-CM

## 2019-01-08 PROCEDURE — 99203 OFFICE O/P NEW LOW 30 MIN: CPT | Performed by: INTERNAL MEDICINE

## 2019-01-08 PROCEDURE — 93000 ELECTROCARDIOGRAM COMPLETE: CPT | Performed by: INTERNAL MEDICINE

## 2019-01-08 NOTE — PROGRESS NOTES
Subjective:     Encounter Date:01/08/2019      Patient ID: Steve Abdi is a 48 y.o. male.    Chief Complaint: heartburn  History of Present Illness    This patient comes into the office today with complaint of heartburn.  He's been in the emergency room twice.  The first time they told him he should follow-up with cardiology.  An appointment was made, but then that was canceled and he was back in the emergency room again with heartburn.  That time they thought that it was reflux and he was scheduled to follow-up with his gastroenterologist.  He has a long history of esophagitis and GERD.  He did see GI, they thought that his symptoms were reflux, and they just his medical therapy.  However, he started feeling bad that he never followed the original recommendations were made an appointment.    He still having episodes of a burning epigastric discomfort.  He sleeps with his head propped up on several pillows.  He tries to lay flat, he'll have more problems at night with this burning discomfort.  Sometimes when he swallows something he'll feel the burning discomfort.  It does not have any effect with activity or exercise.  He has been exercising more lately and that never causes a problem.  He is not had any problem with chills or lightheadedness.  No chest wall trauma.     This patient has not experienced any feeling of palpitations, tachycardia or heart racing and no presyncope or syncope.  There has not been any problems with dizziness or lightheadedness.  There has not been any orthopnea or PND, and no problems with lower extremity edema.  This patient denies any shortness of breath at rest or with activity and has not had any wheezing.  This patient has not had any problems with unexplained nausea or vomiting. The patient has continued to perform daily activities of living without any specific problem or change in the level of activity.  This patient has not been recently hospitalized for any  reason.    This patient has no known cardiac history.  This patient has no history of coronary artery disease, congestive heart failure, rheumatic fever, rheumatic heart disease, congenital heart disease or heart murmur.  This patient has never required invasive cardiovascular evaluation.    The following portions of the patient's history were reviewed and updated as appropriate: allergies, current medications, past family history, past medical history, past social history, past surgical history and problem list.    Past Medical History:   Diagnosis Date   • Atypical chest pain    • Gastroesophageal reflux disease without esophagitis    • Headache, tension-type    • Lactose intolerance    • Numbness and tingling of right side of face    • Pain, face, atypical    • UTI (urinary tract infection)        Past Surgical History:   Procedure Laterality Date   • CIRCUMCISION     • COLONOSCOPY     • HERNIA REPAIR     • UPPER GASTROINTESTINAL ENDOSCOPY  04/19/2013    inactive gastritis        Social History     Socioeconomic History   • Marital status: Single     Spouse name: Not on file   • Number of children: Not on file   • Years of education: Not on file   • Highest education level: Not on file   Social Needs   • Financial resource strain: Not on file   • Food insecurity - worry: Not on file   • Food insecurity - inability: Not on file   • Transportation needs - medical: Not on file   • Transportation needs - non-medical: Not on file   Occupational History   • Not on file   Tobacco Use   • Smoking status: Never Smoker   Substance and Sexual Activity   • Alcohol use: Yes     Alcohol/week: 1.8 oz     Types: 1 Glasses of wine, 1 Cans of beer, 1 Shots of liquor per week   • Drug use: Not on file   • Sexual activity: Not on file   Other Topics Concern   • Not on file   Social History Narrative   • Not on file       Review of Systems   Constitution: Negative for chills, decreased appetite, fever and night sweats.   HENT:  "Negative for ear discharge, ear pain, hearing loss, nosebleeds and sore throat.    Eyes: Negative for blurred vision, double vision and pain.   Cardiovascular: Negative for cyanosis.   Respiratory: Negative for hemoptysis and sputum production.    Endocrine: Negative for cold intolerance and heat intolerance.   Hematologic/Lymphatic: Negative for adenopathy.   Skin: Negative for dry skin, itching, nail changes, rash and suspicious lesions.   Musculoskeletal: Negative for arthritis, gout, muscle cramps, muscle weakness, myalgias and neck pain.   Gastrointestinal: Negative for anorexia, bowel incontinence, constipation, diarrhea, dysphagia, hematemesis and jaundice.   Genitourinary: Negative for bladder incontinence, dysuria, flank pain, frequency, hematuria and nocturia.   Neurological: Negative for focal weakness, numbness, paresthesias and seizures.   Psychiatric/Behavioral: Negative for altered mental status, hallucinations, hypervigilance, suicidal ideas and thoughts of violence.   Allergic/Immunologic: Negative for persistent infections.         ECG 12 Lead  Date/Time: 1/8/2019 11:03 AM  Performed by: Alexandru Jimenez III, MD  Authorized by: Alexandru Jimenez III, MD   Comparison: compared with previous ECG   Similar to previous ECG  Rhythm: sinus rhythm  Rate: normal  Conduction: conduction normal  ST Segments: ST segments normal  T Waves: T waves normal  QRS axis: normal  Other: no other findings  Clinical impression: normal ECG               Objective:     Vitals:    01/08/19 1053   BP: 124/80   Pulse: 73   Weight: 91.2 kg (201 lb)   Height: 185.4 cm (73\")         Physical Exam   Constitutional: He is oriented to person, place, and time. He appears well-developed and well-nourished. No distress.   HENT:   Head: Normocephalic and atraumatic.   Nose: Nose normal.   Mouth/Throat: Oropharynx is clear and moist.   Eyes: Conjunctivae and EOM are normal. Pupils are equal, round, and reactive to light. Right eye " exhibits no discharge. Left eye exhibits no discharge.   Neck: Normal range of motion. Neck supple. No tracheal deviation present. No thyromegaly present.   Cardiovascular: Normal rate, regular rhythm, S1 normal, S2 normal, normal heart sounds and normal pulses. Exam reveals no S3.   Pulmonary/Chest: Effort normal and breath sounds normal. No stridor. No respiratory distress. He exhibits no tenderness.   Abdominal: Soft. Bowel sounds are normal. He exhibits no distension and no mass. There is no tenderness. There is no rebound and no guarding.   Musculoskeletal: Normal range of motion. He exhibits no tenderness or deformity.   Lymphadenopathy:     He has no cervical adenopathy.   Neurological: He is alert and oriented to person, place, and time. He has normal reflexes.   Skin: Skin is warm and dry. No rash noted. He is not diaphoretic. No erythema.   Psychiatric: He has a normal mood and affect. Thought content normal.       Lab Review:             Performed        Assessment:          Diagnosis Plan   1. Other chest pain  ECG 12 Lead   2. Heartburn     3. Gastroesophageal reflux disease, esophagitis presence not specified            Plan:       1. GERD-the symptoms are completely consistent with GERD.  EKG shows no acute change.  I did suggest that he raised/elevate the head of his bed to see if that would assist.  We otherwise we'll plan on seeing him back on an as-needed basis-there is no evidence of any cardiac pathology at this point, and he does not meet guideline recommendations for any additional testing at this time.  Thank you very much for allowing us to participate in the care of this pleasant patient.  Please don't hesitate to call if I can be of assistance in any way.      Current Outpatient Medications:   •  calcium carbonate (TUMS) 500 MG chewable tablet, Chew 1 tablet As Needed., Disp: , Rfl:   •  Milk Thistle 200 MG capsule, Take 1 tablet by mouth As Needed., Disp: , Rfl:   •  pantoprazole  (PROTONIX) 40 MG EC tablet, Take 1 tablet by mouth Daily., Disp: 30 tablet, Rfl: 12         EMR Dragon/Transcription disclaimer:    Much of this encounter note is an electronic transcription/translation of spoken language to printed text. The electronic translation of spoken language may permit erroneous, or at times, nonsensical words or phrases to be inadvertently transcribed; Although I have reviewed the note for such errors, some may still exist.

## 2019-01-16 RX ORDER — RANITIDINE 150 MG/1
150 TABLET ORAL 2 TIMES DAILY
Qty: 60 TABLET | Refills: 12 | Status: SHIPPED | OUTPATIENT
Start: 2019-01-16

## 2019-01-16 NOTE — TELEPHONE ENCOUNTER
"See e-mail.   Per Dr Hayes:   \"Lets try a diff class of medicince, NON PPI, non kidney damaging   Zantac 150mg po BID   #60 12 rf   Stop antidepressant   Probiotic daily   Fiber gummys bid   fdgard and ibgard as needed\"    Zantac e-scribed      "

## 2021-04-02 ENCOUNTER — BULK ORDERING (OUTPATIENT)
Dept: CASE MANAGEMENT | Facility: OTHER | Age: 51
End: 2021-04-02

## 2021-04-02 DIAGNOSIS — Z23 IMMUNIZATION DUE: ICD-10-CM
